# Patient Record
Sex: FEMALE | Race: BLACK OR AFRICAN AMERICAN | URBAN - METROPOLITAN AREA
[De-identification: names, ages, dates, MRNs, and addresses within clinical notes are randomized per-mention and may not be internally consistent; named-entity substitution may affect disease eponyms.]

---

## 2021-05-28 ENCOUNTER — HOSPITAL ENCOUNTER (OUTPATIENT)
Dept: MEDSURG UNIT | Facility: HOSPITAL | Age: 21
End: 2021-05-29
Attending: SURGERY | Admitting: SURGERY

## 2021-05-28 LAB
ABS NEUT (OLG): 13.32 X10(3)/MCL (ref 2.1–9.2)
ALBUMIN SERPL-MCNC: 4 GM/DL (ref 3.5–5)
ALBUMIN/GLOB SERPL: 1 RATIO (ref 1.1–2)
ALP SERPL-CCNC: 52 UNIT/L (ref 40–150)
ALT SERPL-CCNC: 11 UNIT/L (ref 0–55)
APPEARANCE, UA: ABNORMAL
AST SERPL-CCNC: 23 UNIT/L (ref 5–34)
B-HCG SERPL QL: NEGATIVE
BACTERIA #/AREA URNS AUTO: ABNORMAL /HPF
BASOPHILS # BLD AUTO: 0 X10(3)/MCL (ref 0–0.2)
BASOPHILS NFR BLD AUTO: 0 %
BILIRUB SERPL-MCNC: 0.7 MG/DL
BILIRUB UR QL STRIP: NEGATIVE
BILIRUBIN DIRECT+TOT PNL SERPL-MCNC: 0.3 MG/DL (ref 0–0.5)
BILIRUBIN DIRECT+TOT PNL SERPL-MCNC: 0.4 MG/DL (ref 0–0.8)
BUN SERPL-MCNC: 9.7 MG/DL (ref 7–18.7)
CALCIUM SERPL-MCNC: 9.6 MG/DL (ref 8.4–10.2)
CHLORIDE SERPL-SCNC: 101 MMOL/L (ref 98–107)
CO2 SERPL-SCNC: 24 MMOL/L (ref 22–29)
COLOR UR: YELLOW
CREAT SERPL-MCNC: 0.69 MG/DL (ref 0.55–1.02)
EOSINOPHIL # BLD AUTO: 0 X10(3)/MCL (ref 0–0.9)
EOSINOPHIL NFR BLD AUTO: 0 %
ERYTHROCYTE [DISTWIDTH] IN BLOOD BY AUTOMATED COUNT: 15.4 % (ref 11.5–14.5)
GLOBULIN SER-MCNC: 3.9 GM/DL (ref 2.4–3.5)
GLUCOSE (UA): NEGATIVE
GLUCOSE SERPL-MCNC: 101 MG/DL (ref 74–100)
HCT VFR BLD AUTO: 38.3 % (ref 35–46)
HGB BLD-MCNC: 12.6 GM/DL (ref 12–16)
HGB UR QL STRIP: 0.03 MG/DL
HYALINE CASTS #/AREA URNS LPF: ABNORMAL /LPF
IMM GRANULOCYTES # BLD AUTO: 0.06 10*3/UL
IMM GRANULOCYTES NFR BLD AUTO: 0 %
KETONES UR QL STRIP: 60 MG/DL
LEUKOCYTE ESTERASE UR QL STRIP: 500 LEU/UL
LIPASE SERPL-CCNC: 19 U/L
LYMPHOCYTES # BLD AUTO: 1 X10(3)/MCL (ref 0.6–4.6)
LYMPHOCYTES NFR BLD AUTO: 6 %
MCH RBC QN AUTO: 29.3 PG (ref 26–34)
MCHC RBC AUTO-ENTMCNC: 32.9 GM/DL (ref 31–37)
MCV RBC AUTO: 89.1 FL (ref 80–100)
MONOCYTES # BLD AUTO: 2 X10(3)/MCL (ref 0.1–1.3)
MONOCYTES NFR BLD AUTO: 12 %
NEUTROPHILS # BLD AUTO: 13.32 X10(3)/MCL (ref 2.1–9.2)
NEUTROPHILS NFR BLD AUTO: 81 %
NITRITE UR QL STRIP: NEGATIVE
NRBC BLD AUTO-RTO: 0 % (ref 0–0.2)
PH UR STRIP: 6.5 [PH] (ref 4.5–8)
PLATELET # BLD AUTO: 265 X10(3)/MCL (ref 130–400)
PMV BLD AUTO: 9.6 FL (ref 7.4–10.4)
POTASSIUM SERPL-SCNC: 4.3 MMOL/L (ref 3.5–5.1)
PROT SERPL-MCNC: 7.9 GM/DL (ref 6.4–8.3)
PROT UR QL STRIP: 100 MG/DL
RBC # BLD AUTO: 4.3 X10(6)/MCL (ref 4–5.2)
RBC #/AREA URNS AUTO: ABNORMAL /HPF
SARS-COV-2 AG RESP QL IA.RAPID: NEGATIVE
SODIUM SERPL-SCNC: 136 MMOL/L (ref 136–145)
SP GR UR STRIP: 1.04 (ref 1–1.03)
SQUAMOUS #/AREA URNS LPF: >100 /LPF
UROBILINOGEN UR STRIP-ACNC: 2 MG/DL
WBC # SPEC AUTO: 16.4 X10(3)/MCL (ref 4.5–11)
WBC #/AREA URNS AUTO: >=100 /HPF

## 2021-05-29 LAB
ABS NEUT (OLG): 15.32 X10(3)/MCL (ref 2.1–9.2)
ALBUMIN SERPL-MCNC: 3.1 GM/DL (ref 3.5–5)
ALBUMIN/GLOB SERPL: 0.9 RATIO (ref 1.1–2)
ALP SERPL-CCNC: 57 UNIT/L (ref 40–150)
ALT SERPL-CCNC: 17 UNIT/L (ref 0–55)
AST SERPL-CCNC: 32 UNIT/L (ref 5–34)
BASOPHILS # BLD AUTO: 0 X10(3)/MCL (ref 0–0.2)
BASOPHILS NFR BLD AUTO: 0 %
BILIRUB SERPL-MCNC: 0.9 MG/DL
BILIRUBIN DIRECT+TOT PNL SERPL-MCNC: 0.4 MG/DL (ref 0–0.8)
BILIRUBIN DIRECT+TOT PNL SERPL-MCNC: 0.5 MG/DL (ref 0–0.5)
BUN SERPL-MCNC: 5.7 MG/DL (ref 7–18.7)
CALCIUM SERPL-MCNC: 8.7 MG/DL (ref 8.4–10.2)
CHLORIDE SERPL-SCNC: 104 MMOL/L (ref 98–107)
CO2 SERPL-SCNC: 22 MMOL/L (ref 22–29)
CREAT SERPL-MCNC: 0.57 MG/DL (ref 0.55–1.02)
EOSINOPHIL # BLD AUTO: 0 X10(3)/MCL (ref 0–0.9)
EOSINOPHIL NFR BLD AUTO: 0 %
ERYTHROCYTE [DISTWIDTH] IN BLOOD BY AUTOMATED COUNT: 15.3 % (ref 11.5–14.5)
GLOBULIN SER-MCNC: 3.4 GM/DL (ref 2.4–3.5)
GLUCOSE SERPL-MCNC: 79 MG/DL (ref 74–100)
HCT VFR BLD AUTO: 32.7 % (ref 35–46)
HGB BLD-MCNC: 10.6 GM/DL (ref 12–16)
IMM GRANULOCYTES # BLD AUTO: 0.07 10*3/UL
IMM GRANULOCYTES NFR BLD AUTO: 0 %
LYMPHOCYTES # BLD AUTO: 0.5 X10(3)/MCL (ref 0.6–4.6)
LYMPHOCYTES NFR BLD AUTO: 3 %
MCH RBC QN AUTO: 28.8 PG (ref 26–34)
MCHC RBC AUTO-ENTMCNC: 32.4 GM/DL (ref 31–37)
MCV RBC AUTO: 88.9 FL (ref 80–100)
MONOCYTES # BLD AUTO: 2.1 X10(3)/MCL (ref 0.1–1.3)
MONOCYTES NFR BLD AUTO: 12 %
NEUTROPHILS # BLD AUTO: 15.32 X10(3)/MCL (ref 2.1–9.2)
NEUTROPHILS NFR BLD AUTO: 85 %
NRBC BLD AUTO-RTO: 0 % (ref 0–0.2)
PLATELET # BLD AUTO: 222 X10(3)/MCL (ref 130–400)
PMV BLD AUTO: 10.8 FL (ref 7.4–10.4)
POTASSIUM SERPL-SCNC: 3.8 MMOL/L (ref 3.5–5.1)
PROT SERPL-MCNC: 6.5 GM/DL (ref 6.4–8.3)
RBC # BLD AUTO: 3.68 X10(6)/MCL (ref 4–5.2)
SODIUM SERPL-SCNC: 135 MMOL/L (ref 136–145)
WBC # SPEC AUTO: 18.1 X10(3)/MCL (ref 4.5–11)

## 2022-04-30 NOTE — ED PROVIDER NOTES
Patient:   Molly Carballo            MRN: [id number]            FIN: [id number]               Age:   21 years     Sex:  Female     :  2000   Associated Diagnoses:   Cholecystitis   Author:   Tom Hernandez      Basic Information   Time seen: Immediately upon arrival.   History source: Patient.   Arrival mode: Private vehicle.   History limitation: None.   Additional information: Chief Complaint from Nursing Triage Note : Chief Complaint   2021 12:47 CDT      Chief Complaint           Pt c/o abd pain x 3 days. She denies any n/v/d. Last bm 3 days ago.  .   Provider/Visit info:   Time Seen:  Tom Hernandez / 2021 13:24  .   History of Present Illness   The patient presents with abdominal pain.  The onset was 3  days ago.  The course/duration of symptoms is fluctuating in intensity.  The character of symptoms is achy.  The degree at onset was moderate.  The Location of pain at onset was diffuse and abdominal.  The degree at present is minimal.  The Location of pain at present is diffuse and abdominal.  Radiating pain: none. The exacerbating factor is eating.  The relieving factor is none.  Therapy today: none.  Risk factors consist of none.  Associated symptoms: nausea.  Additional history: Pt presents to the [facility] ED reporting abdominal pain x 3 days which began after she ate her dinner that night. Denies chest pain, SOB, weakness, dizziness, fever, diarrhea, or vomiting. Reports her last BM was the morning of her symptoms beginning. .        Review of Systems   Constitutional symptoms:  Negative except as documented in HPI.   Skin symptoms:  Negative except as documented in HPI.   Eye symptoms:  Negative except as documented in HPI.   ENMT symptoms:  Negative except as documented in HPI.   Respiratory symptoms:  Negative except as documented in HPI.   Cardiovascular symptoms:  Negative except as documented in HPI.   Gastrointestinal symptoms:  Negative except as documented in  HPI.   Genitourinary symptoms:  Negative except as documented in HPI.   Musculoskeletal symptoms:  Negative except as documented in HPI.   Neurologic symptoms:  Negative except as documented in HPI.   Psychiatric symptoms:  Negative except as documented in HPI.   Endocrine symptoms:  Negative except as documented in HPI.   Hematologic/Lymphatic symptoms:  Negative except as documented in HPI.   Allergy/immunologic symptoms:  Negative except as documented in HPI.      Health Status   Allergies:    Allergic Reactions (Selected)  No Known Allergies,    Allergies (1) Active Reaction  No Known Allergies None Documented  .   Medications:  (Selected)   Inpatient Medications  Ordered  Normal Saline (0.9% NS) IV: 1,000 mL, 1,000 mL, IV, Once, 1000 mL/hr, start date 20 10:42:00 CDT, stop date 20 10:42:00 CDT, STAT  Zofran 2 mg/mL injectable solution: 4 mg, form: Injection, IV Push, Once, first dose 20 10:42:00 CDT, stop date 20 10:42:00 CDT, STAT  Prescriptions  Prescribed  Bentyl 10 mg oral capsule: 10 mg = 1 cap(s), Oral, QID, PRN PRN abdominal cramping, # 40 cap(s), 0 Refill(s)  Zofran ODT 4 mg oral tablet, disintegratin mg = 1 tab(s), Oral, q6hr, PRN PRN nausea/vomiting, # 10 tab(s), 0 Refill(s), per nurse's notes.   Immunizations: Per nurse's notes.   Menstrual history: Per nurse's notes.      Past Medical/ Family/ Social History   Medical history:    No active or resolved past medical history items have been selected or recorded., Reviewed as documented in chart.   Surgical history:    Appendectomy ([id number])., Reviewed as documented in chart.   Family history:    No family history items have been selected or recorded., Reviewed as documented in chart.   Social history:    Social & Psychosocial Habits    Alcohol  2018  Use: Never    Substance Use  2018  Use: Never    Tobacco  2018  Use: Never smoker    10/23/2020  Use: Never (less than 100 in l    Patient Wants Consult For  Cessation Counseling No    05/28/2021  Use: Never (less than 100 in l    Patient Wants Consult For Cessation Counseling No    Abuse/Neglect  10/23/2020  SHX Any signs of abuse or neglect No    05/28/2021  SHX Any signs of abuse or neglect No    Feels unsafe at home: No    Safe place to go: Yes  , Alcohol use: Denies, Tobacco use: Denies, Drug use: Denies.   Problem list:    Active Problems (1)  No Chronic Problems   .      Physical Examination               Vital Signs   Vital Signs   5/28/2021 12:47 CDT      Temperature Temporal Artery               36.5 DegC                             Peripheral Pulse Rate     114 bpm  HI                             Respiratory Rate          16 br/min                             SpO2                      100 %                             Oxygen Therapy            Room air                             Systolic Blood Pressure   111 mmHg                             Diastolic Blood Pressure  73 mmHg  .      Vital Signs (last 24 hrs)_____  Last Charted___________  Heart Rate Peripheral   H 114bpm  (MAY 28 12:47)  Resp Rate         16 br/min  (MAY 28 12:47)  SBP      111 mmHg  (MAY 28 12:47)  DBP      73 mmHg  (MAY 28 12:47)  SpO2      100 %  (MAY 28 12:47)  Weight      60.4 kg  (MAY 28 12:47)  Height      167 cm  (MAY 28 12:47)  BMI      21.66  (MAY 28 12:47)  .   Measurements   5/28/2021 12:47 CDT      Weight Dosing             60.4 kg                             Weight Measured           60.4 kg                             Weight Measured and Calculated in Lbs     133.16 lb                             Height/Length Dosing      167 cm                             Height/Length Measured    167 cm                             Body Mass Index Measured  21.66 kg/m2  .   Basic Oxygen Information   5/28/2021 12:47 CDT      SpO2                      100 %                             Oxygen Therapy            Room air  .   General:  Alert, mild distress.    Skin:  Warm, dry, pink, intact.     Head:  Normocephalic, atraumatic.    Neck:  Supple, trachea midline, no tenderness.    Eye:  Pupils are equal, round and reactive to light, extraocular movements are intact, normal conjunctiva, vision unchanged.    Ears, nose, mouth and throat:  Tympanic membranes clear, oral mucosa moist, no pharyngeal erythema or exudate.    Cardiovascular:  Regular rate and rhythm, No murmur, Normal peripheral perfusion, No edema.    Respiratory:  Lungs are clear to auscultation, respirations are non-labored, breath sounds are equal, Symmetrical chest wall expansion.    Chest wall:  No tenderness, No deformity.    Back:  Nontender, Normal range of motion, Normal alignment, No costovertebral angle tenderness,    Musculoskeletal:  Normal ROM, normal strength, no tenderness, no swelling.    Gastrointestinal:  Soft, Non distended, Normal bowel sounds, Tenderness: Mild, generalized, Guarding: Negative, Rebound: Negative, Organomegaly: Negative, Mass: Negative, Signs: McBurney's negative, Jansen's negative.    Neurological:  Alert and oriented to person, place, time, and situation, No focal neurological deficit observed, CN II-XII intact, normal sensory observed, normal motor observed, normal speech observed, normal coordination observed.    Lymphatics:  No lymphadenopathy.   Psychiatric:  Cooperative, appropriate mood & affect, normal judgment.       Medical Decision Making   Differential Diagnosis:  Abdominal pain, bowel obstruction, constipation.    Documents reviewed:  Emergency department nurses' notes, emergency department records, prior records.    Results review:     No qualifying data available, All Results   5/28/2021 14:36 CDT      Est Creat Clearance Ser   118.55 mL/min    5/28/2021 14:12 CDT      WBC                       16.4 x10(3)/mcL  HI                             RBC                       4.30 x10(6)/mcL                             Hgb                       12.6 gm/dL                             Hct                        38.3 %                             Platelet                  265 x10(3)/mcL                             MCV                       89.1 fL                             MCH                       29.3 pg                             MCHC                      32.9 gm/dL                             RDW                       15.4 %  HI                             MPV                       9.6 fL                             Abs Neut                  13.32 x10(3)/mcL  HI                             Neutro Auto               81 %  NA                             Lymph Auto                6 %  NA                             Mono Auto                 12 %  NA                             Eos Auto                  0 %  NA                             Abs Eos                   0.0 x10(3)/mcL                             Basophil Auto             0 %  NA                             Abs Neutro                13.32 x10(3)/mcL  HI                             Abs Lymph                 1.0 x10(3)/mcL                             Abs Mono                  2.0 x10(3)/mcL  HI                             Abs Baso                  0.0 x10(3)/mcL                             NRBC%                     0.0 %                             IG%                       0 %  NA                             IG#                       0.060  NA                             Sodium Lvl                136 mmol/L                             Potassium Lvl             4.3 mmol/L                             Chloride                  101 mmol/L                             CO2                       24 mmol/L                             Calcium Lvl               9.6 mg/dL                             Glucose Lvl               101 mg/dL  HI                             BUN                       9.7 mg/dL                             Creatinine                0.69 mg/dL                             eGFR-AA                   >105                             eGFR-JOSEY                   >105 mL/min/1.73 m2                             Bili Total                0.7 mg/dL                             Bili Direct               0.3 mg/dL                             Bili Indirect             0.40 mg/dL                             AST                       23 unit/L                             ALT                       11 unit/L                             Alk Phos                  52 unit/L                             Total Protein             7.9 gm/dL                             Albumin Lvl               4.0 gm/dL                             Globulin                  3.9 gm/dL  HI                             A/G Ratio                 1.0 ratio  LOW                             Lipase Lvl                19 U/L    5/28/2021 12:45 CDT      UA Appear                 Hazy                             UA Color                  YELLOW                             UA Spec Grav              1.039  HI                             UA Bili                   Negative                             UA pH                     6.5                             UA Urobilinogen           2 mg/dL                             UA Blood                  0.03 mg/dL                             UA Glucose                Negative                             UA Ketones                60 mg/dL                             UA Protein                100 mg/dL                             UA Nitrite                Negative                             UA Leuk Est               500 [place]ona/uL                             UA WBC Interp             >=100 /HPF                             UA RBC Interp             3-5 /HPF                             UA Bact Interp            Few /HPF                             UA Trichomonas            Moderate                             UA Squam Epi Interp       >100 /LPF                             UA Hyal Cast Interp       11-25 /LPF                             U Beta hCG Ql             Negative  ,  Interpretation Interpreted by ANJUM BURNETT.    Radiology results:  Emergency physician interpretation: Radiologist report reviewed per ANJUM BURNETT, Rad Results (ST)  < 12 hrs   Accession: [id number]  Order: CT Abdomen and Pelvis W Contrast  Report Dt/Tm: 05/28/2021 16:07  Report:   INDICATION: Abdominal Pain  COMPARISON:  None.     TECHNIQUE:   CT of the abdomen and pelvis WITH intravenous contrast. The abdomen  and pelvis were scanned utilizing a multidetector helical scanner from  the diaphragm to the lesser trochanter after the administration of  intravenous contrast. Coronal and sagittal reformations were obtained.  Automatic exposure control was utilized to limit radiation dose.     Radiation Dose:  Total DLP: 464 mGy*cm     DISCUSSION:  LOWER THORAX: Lung bases clear. No pericardial or pleural effusions.     HEPATOBILIARY: Liver size within normal limits. Subcentimeter right  hepatic hypodensities are too small to characterize, statistically  cysts or hemangiomas. Geographic hypodensity at the falciform  ligament, likely focal fatty infiltration. Otherwise no focal hepatic  lesions. No biliary ductal dilatation. Distended gallbladder with  diffuse wall thickening/pericholecystic fluid. No radiodense  gallstones.  SPLEEN: Spleen size within normal limits. No focal splenic lesions.     PANCREAS: No focal masses or ductal dilatation.  ADRENALS: No adrenal nodules.  KIDNEYS/URETERS: No hydronephrosis, stones, or solid mass lesions.     PELVIC ORGANS/BLADDER: Right ovarian 1.7 cm and left ovarian 1.9 cm  cysts.  PERITONEUM / RETROPERITONEUM: No free intraperitoneal air. Small free  fluid in the pelvis, slightly greater than that of simple fluid  attenuation.     LYMPH NODES: No lymphadenopathy.  VESSELS: Circumaortic left renal vein.     GI TRACT: No distention or wall thickening. Appendix not visualized.     BONES AND SOFT TISSUES: Soft tissues within normal limits. No bony  destructive lesions. No acute bony  pathology.     IMPRESSION:      1. Acute cholecystitis.     2. Small free fluid in the pelvis may relate to a ruptured ovarian  cyst.    .       Reexamination/ Reevaluation   Time: 5/28/2021 16:42:00 .   Course: improving.   Pain status: decreased.      Impression and Plan   Diagnosis   Cholecystitis (VHX35-CI K81.9)      Calls-Consults   -  5/28/2021 16:53:00 , Surgery, Discusssed pt status with Surgery On Call. Physician will evaluate pt at bedside. .    Plan   Disposition: Admit time  5/28/2021 17:00:00, Admit to Surgery, Patient care transitioned to: Time: 5/28/2021 17:00:00, Serafin JOHNSON, oTm KOLB    Counseled: Patient, Regarding diagnosis, Regarding diagnostic results, Regarding treatment plan, Patient indicated understanding of instructions.

## 2022-05-05 NOTE — HISTORICAL [facility] CERNER
This is a historical note converted from [employer]. Formatting and pictures may have been removed.  Please reference [employer] for original formatting and attached multimedia. Admit and Discharge Dates  Admit Date: 05/28/2021  Discharge Date: 05/29/2021  Physicians  Attending Physician - Bob JOHNSON, Neftaly VALERO  Admitting Physician - Bob JOHNSON, Neftaly VALERO  Primary Care Physician - No PCP, No  Discharge Diagnosis  Abdominal pain?[id number]  Cholecystitis?K81.9  Surgical Procedures  05/29/2021 - [id number] - Cholecystectomy Laparoscopic  Immunizations  No immunizations recorded for this visit.  Admission Information  Patient is a 22 yo female who presented to the ED with acute cholecystitis and was admitted to the General Surgery service.  Significant Findings  22 yo female who presented to the ED with a 3 day history of abdominal pain concerning for acute cholecystitis. She was admitted to the General Surgery service and started on IV antibiotics and IV fluids. She was also found to have trichomonas on UA. She was taken to the OR the next morning for a laparoscopic cholecystectomy, which she tolerated without complication, please see operative note for details. Post-operatively she was ambulating, tolerating a regular diet, and her pain was controlled. She was determined to be stable for discharge with appropriate follow up.  Time Spent on discharge  <30 minutes  Objective  Vitals & Measurements  T:?36.8? ?C (Oral)? TMIN:?36.5? ?C (Temporal Artery)? TMAX:?38? ?C (Oral)? HR:?85(Peripheral)? RR:?18? BP:?113/68? SpO2:?99%? WT:?60.4?kg? BMI:?21.66?  Physical Exam  NAD  Stable on room air  RRR  Abd soft, ND, appropriately tender, surgical sites c/d  ?  I was present with the Resident during discharge day management.  ???  [x ] I discussed the case with the Resident and agree with the discharge plan as above.  ?   Neftaly Rojas MD  Patient Discharge Condition  Stable  Discharge Disposition  Home    Discharge Medication Reconciliation  Discharge Med Rec is not complete  Car Se[employer]  No Qualifying Data

## 2022-05-05 NOTE — HISTORICAL [facility] C[facility]
This is a historical note converted from [employer]. Formatting and pictures may have been removed.  Please reference [employer] for original formatting and attached multimedia. Indication for Surgery  21 year old female presenting with 3 days of abdominal pain, n/v. CT abdomen was obtained showing a thickened gallbladder wall with pericholecystic fluid. US was obtained showing a large stone in the neck of the gallbladder. Risks, benefits and alternatives to surgery were discussed and she elected to proceed.  Preoperative Diagnosis  Acute cholecystitis  Postoperative Diagnosis  Same  Operation  Laparoscopic cholecystectomy  Surgeon(s)  Dr. Suleiman Red  Anesthesia  GETA  Estimated Blood Loss  30?mL  Findings  Inflamed gallbladder, cholelithiasis  Specimen(s)  Gallbladder  Complications  None  Technique  The patient was taken to the OR and laid supine. GETA was induced without complications. The left arm was tucked and the abdominal wall was prepped and draped in sterile fashion. A time out was held. A Varese needle was inserted below the umbilicus and insufflation obtained. A Visiport trocar was used to enter the abdomen at this site. A diagnostic laparoscopy was performed without any noted injury to the bowel or mesentery. Two more 5 mm trocars were inserted in the right angie-abdomen. A 12 mm trocar was inserted in the subxiphoid position. All were placed under direct visualization. She was placed in reverse Trendelenburg and the omentum was swept off the gallbladder with graspers. The gallbladder was very inflamed and edematous. We?decompressed the structure?with a needle. The fundus was then retracted cephalad while the infundibulum was pulled laterally exposing the triangle of Calot. The artery was dissected free first. The cystic duct was then dissected out with hook cautery and [place] forceps. To provide better visualization we first clipped and ligated the artery. The cystic plate was then cleared free of  attachments. The cystic duct was doubly clipped and ligated. The gallbladder was dissected free from the liver bed with electrocautery. It was removed from the subxiphoid trocar site with an EndoCatch bag. It contained a large stone. Next we irrigated and suctioned blood and bile from the liver bed and hepatic flexure until the irrigant ran clear. The liver bed was hemostatic. Inspection of the clips showed them to be secure. A that time we removed the trocars and evacuated insufflation. The subxiphoid fascia was closed with a 0-Vicryl. Skin was closed with PDS. Dermabond was applied. Final lap and instrument counts were correct x2. The patient awoke from anesthesia and was transported to the PACU in stable condition. Dr. Rojas was present for the entire operation.  ?  Nicho Bearden MD PGY3  ?  This certifies I was present during the entire period between opening and closing of the surgical field.  ?  Neftaly Rojas MD

## 2022-05-05 NOTE — HISTORICAL [facility]
This is a historical note converted from [employer]. Formatting and pictures may have been removed.  Please reference [employer]iezer for original formatting and attached multimedia. Chief Complaint  Pt c/o abd pain x 3 days. She denies any n/v/d. Last bm 3 days ago.  History of Present Illness  Patient is a 20 yo female with history of appendicitis s/p laparoscopic appendectomy 3 years ago who presented to the ED with a 3 day history of abdominal pain. The pain started 3 days ago after eating fried chicken and developing nausea, vomiting, and abdominal pain. She has vomited multiple times since then and her pain has worsened. She last ate some soup yesterday and her last BM was yesterday as well. She denies, fevers, chills, constipation, or diarrhea.  ?  PMH  None  ?  PSH  Laparoscopic appendectomy 3 years ago  ?  FH  Non-contributory  ?  SH  Never smoker  Denies alcohol or drug use  Review of Systems  As per HPI  Physical Exam  Vitals & Measurements  T:?37.3? ?C (Oral)? TMIN:?36.5? ?C (Temporal Artery)? TMAX:?37.8? ?C (Temporal Artery)? HR:?102(Peripheral)? RR:?16? BP:?120/63? SpO2:?99%? WT:?60.4?kg? BMI:?21.66?  NAD  Stable on room air  RRR  Abd soft, ND, RUQ and epigastric tenderness, negative Plymouth sign  ?  Lab Results  WBC 16.4  Hgb 12.6  Cr 0.69  ?  Imaging  (05/28/2021 16:05 CDT CT Abdomen and Pelvis W Contrast)  IMPRESSION:?  1. Acute cholecystitis.  2. Small free fluid in the pelvis may relate to a ruptured ovarian  cyst. [1]  ?  (05/28/2021 20:45 CDT US Abdomen Complete) ?  IMPRESSION: Cholelithiasis with gallbladder wall edema/thickening and  pericholecystic fluid, suspicious for acute cholecystitis. [2]  Assessment/Plan  Patient is a 20 yo female with history of laparoscopic appendectomy 3 years ago who presented to the ED with acute cholecystitis.  -Admit to General Surgery  -Will plan for cholecystectomy in the morning  -Informed consent obtained  -Ancef and heparin on call to the OR  -Zosyn  -NPO at  midnight  -PRN pain control, antiemetics  ?  Dana De Luna MD  [employer] General Surgery, PGY-1  ?   Above Hx and assessment reviewed and discussed with Resident.  Agree with plan of care.  ?   Neftaly Rojas MD  ?   Problem List/Past Medical History  Ongoing  No chronic problems  Historical  No qualifying data  Procedure/Surgical History  Appendectomy   Medications  Inpatient  IVF Lactated Ringers LR Infusion 1,000 mL, 1000 mL, IV  Normal Saline (0.9% NS) IV, 1000 mL, IV, Once  Zofran 2 mg/mL injectable solution, 4 mg= 2 mL, IV Push, Once  Home  Bentyl 10 mg oral capsule, 10 mg= 1 cap(s), Oral, QID, PRN,? ?Not Taking, Completed Rx  Zofran ODT 4 mg oral tablet, disintegrating, 4 mg= 1 tab(s), Oral, q6hr, PRN,? ?Not Taking, Completed Rx  Allergies  No Known Allergies  Social History  Abuse/Neglect  No, No, Yes, 05/28/2021  No, 10/23/2020  Alcohol  Never, 04/01/2018  Substance Use  Never, 04/01/2018  Tobacco  Never (less than 100 in lifetime), No, 05/28/2021  Never (less than 100 in lifetime), No, 10/23/2020  Never smoker, 04/01/2018     [1]?CT Abdomen and Pelvis W Contrast; Gage JOHNSON, Honorio Marcos 05/28/2021 16:05 CDT  [2]?US Abdomen Complete; Cirilo JOHNSON, Hai Jolley 05/28/2021 20:45 CDT

## 2024-10-14 ENCOUNTER — OFFICE VISIT (OUTPATIENT)
Dept: OBSTETRICS AND GYNECOLOGY | Facility: CLINIC | Age: 24
End: 2024-10-14
Payer: MEDICAID

## 2024-10-14 VITALS
WEIGHT: 135.63 LBS | BODY MASS INDEX: 21.89 KG/M2 | SYSTOLIC BLOOD PRESSURE: 127 MMHG | HEART RATE: 102 BPM | DIASTOLIC BLOOD PRESSURE: 82 MMHG

## 2024-10-14 DIAGNOSIS — Z01.419 ROUTINE GYNECOLOGICAL EXAMINATION: ICD-10-CM

## 2024-10-14 DIAGNOSIS — N93.9 ABNORMAL UTERINE BLEEDING (AUB): Primary | ICD-10-CM

## 2024-10-14 RX ORDER — NORETHINDRONE ACETATE AND ETHINYL ESTRADIOL .02; 1 MG/1; MG/1
1 TABLET ORAL DAILY
Qty: 90 TABLET | Refills: 3 | Status: SHIPPED | OUTPATIENT
Start: 2024-10-14 | End: 2025-10-14

## 2024-10-14 NOTE — PROGRESS NOTES
Chief Complaint: AUB    HPI: Patient is a 24 y.o. y.o. female G0 who presents to GYN clinic for AUB.  Patient reports a 1 month menstrual cycle that occurred a few months ago, she reports prior to this her cycles are monthly lasting 5 days with mild bleeding.  She was not on any medication for her cycle or for contraception.  She reports after she stopped bleeding her next cycle were lighter than normal, and this last month she has not had a cycle.  She has no other complaints today.    Review of Systems   Constitutional:  Negative for chills and fever.   HENT:  Negative for congestion and sore throat.    Respiratory:  Negative for cough and shortness of breath.    Cardiovascular:  Negative for chest pain and palpitations.   Gastrointestinal:  Negative for abdominal pain, nausea and vomiting.   Genitourinary:  Negative for dysuria, frequency and urgency.   Musculoskeletal:  Negative for back pain.   Skin:  Negative for itching and rash.   Neurological:  Negative for headaches.   All other systems reviewed and are negative.      PMH:  None  PSH:  Appendectomy  NKDA  Obhx: G0  GYNhx: denies abnormal pap smears, + h/o Cz - tx'd, denies other STD's  Social hx: denies t/d/e  Family hx: denies breast, colon, ovarian or uterine cancers.   Current Outpatient Medications   Medication Instructions    cyproheptadine (PERIACTIN) 4 mg, Oral, 3 times daily PRN    ondansetron (ZOFRAN-ODT) 4 mg, Oral, Every 8 hours PRN     Physical Exam:  Vitals:    10/14/24 1145   BP: 127/82   Pulse: 102   Weight: 61.5 kg (135 lb 9.6 oz)       Gen: NAD, well developed, well nourished  Psych: alert and oriented x 3, normal affect  HEENT: normocephalic, atraumatic  Abd: soft, non-tender, non-distended  Pelvic:  Normal external female genitalia, no masses or lesions, vagina pink with rugated, no vaginal bleeding, small amount of thin white vaginal discharge, not friable cervix  Bimanual exam: No cervical motion tenderness, uterus appears normal in  size, no adnexal masses or tenderness  Skin: Warm and dry  Ext: no c/c/c, moves all extremities  Neurological: normal gait, gross motor function intact    Assessment: Patient is a 24 y.o. y.o. female G0 with  Patient Active Problem List   Diagnosis    Abnormal uterine bleeding (AUB)       Plan:  UPT today   Pap smear, gonorrhea/chlamydia/Trichomonas ordered today   We will exam uterus she is not appear to be in large, no adnexal masses  Discussed treatment with OCPs  For cycles returned to normal patient may discontinue medication she desires to continue   Return to clinic in 3 months for follow-up    Juan Magallanes MD

## 2024-10-16 LAB
CHLAMYDIA: NEGATIVE
GONORRHEA: POSITIVE
SOURCE: ABNORMAL
SOURCE: NORMAL
TRICHOMONAS AMPLIFIED: NEGATIVE

## 2024-10-17 DIAGNOSIS — A54.9 GONORRHEA: Primary | ICD-10-CM

## 2024-10-17 LAB — Lab: NORMAL

## 2024-10-17 RX ORDER — DOXYCYCLINE 100 MG/1
100 CAPSULE ORAL 2 TIMES DAILY
Qty: 14 CAPSULE | Refills: 0 | Status: CANCELLED | OUTPATIENT
Start: 2024-10-17 | End: 2024-10-24

## 2024-10-18 DIAGNOSIS — A54.9 GONORRHEA: Primary | ICD-10-CM

## 2024-10-18 RX ORDER — DOXYCYCLINE HYCLATE 100 MG/1
100 TABLET, DELAYED RELEASE ORAL 2 TIMES DAILY
Qty: 14 TABLET | Refills: 0 | Status: SHIPPED | OUTPATIENT
Start: 2024-10-18 | End: 2024-10-25

## 2024-10-18 RX ORDER — DOXYCYCLINE HYCLATE 100 MG/1
100 TABLET, DELAYED RELEASE ORAL 2 TIMES DAILY
COMMUNITY
End: 2024-10-18 | Stop reason: SDUPTHER

## 2024-10-22 DIAGNOSIS — A54.9 GONORRHEA: ICD-10-CM

## 2024-10-22 RX ORDER — DOXYCYCLINE HYCLATE 100 MG/1
100 TABLET, DELAYED RELEASE ORAL 2 TIMES DAILY
Qty: 14 TABLET | Refills: 0 | Status: SHIPPED | OUTPATIENT
Start: 2024-10-22 | End: 2024-10-29

## 2024-10-22 NOTE — TELEPHONE ENCOUNTER
Spoke with patient regarding her medication. She stated that it would cost her $60. We realized that her medicaid isn't accepted by the pharmacy we sent the medication to. We notified her of this and offered alternative pharmacies that would cover the cost of the medicine. I sent a medication request to Dr. Magallanes to her new pharmacy and advised her that she come in tomorrow to be given her shot. Patient verbalized understanding.    Claudia  ----- Message from [employer] sent at 10/21/2024  8:30 AM CDT -----  Contact: self [phone]  Type:  Needs Medical Advice    Who Called: Champagne   Symptoms (please be specific): Medication,doxycycline (DORYX) 100 MG EC tablet  Pharmacy name and phone #:    [id number] - [place], [place] - [address] AT [address] & [address]  [address] [address]  Phone: [phone] Fax: [phone]  Would the patient rather a call back or a response via [facility]? Call back   Best Call Back Number: [phone]   Additional Information: pt is requesting an alternate med that is covered by insurance , also needing to know what time to come today for shot

## 2024-10-23 ENCOUNTER — CLINICAL SUPPORT (OUTPATIENT)
Dept: OBSTETRICS AND GYNECOLOGY | Facility: CLINIC | Age: 24
End: 2024-10-23
Payer: MEDICAID

## 2024-10-23 DIAGNOSIS — A54.9 GONORRHEA: Primary | ICD-10-CM

## 2024-10-23 RX ORDER — CEFTRIAXONE 500 MG/1
500 INJECTION, POWDER, FOR SOLUTION INTRAMUSCULAR; INTRAVENOUS
Status: COMPLETED | OUTPATIENT
Start: 2024-10-23 | End: 2024-10-23

## 2024-10-23 RX ADMIN — CEFTRIAXONE 500 MG: 500 INJECTION, POWDER, FOR SOLUTION INTRAMUSCULAR; INTRAVENOUS at 08:10

## 2025-01-10 ENCOUNTER — PATIENT MESSAGE (OUTPATIENT)
Dept: OBSTETRICS AND GYNECOLOGY | Facility: CLINIC | Age: 25
End: 2025-01-10
Payer: MEDICAID